# Patient Record
Sex: FEMALE | Race: BLACK OR AFRICAN AMERICAN | Employment: FULL TIME | URBAN - METROPOLITAN AREA
[De-identification: names, ages, dates, MRNs, and addresses within clinical notes are randomized per-mention and may not be internally consistent; named-entity substitution may affect disease eponyms.]

---

## 2018-07-02 ENCOUNTER — HOSPITAL ENCOUNTER (EMERGENCY)
Age: 43
Discharge: HOME | End: 2018-07-02
Attending: EMERGENCY MEDICINE
Payer: COMMERCIAL

## 2018-07-02 VITALS
HEIGHT: 68 IN | SYSTOLIC BLOOD PRESSURE: 130 MMHG | RESPIRATION RATE: 20 BRPM | OXYGEN SATURATION: 100 % | WEIGHT: 230 LBS | BODY MASS INDEX: 34.86 KG/M2 | HEART RATE: 79 BPM | TEMPERATURE: 98.5 F | DIASTOLIC BLOOD PRESSURE: 86 MMHG

## 2018-07-02 DIAGNOSIS — M79.601 RIGHT ARM PAIN: Primary | ICD-10-CM

## 2018-07-02 PROCEDURE — 93005 ELECTROCARDIOGRAM TRACING: CPT

## 2018-07-02 PROCEDURE — 6370000000 HC RX 637 (ALT 250 FOR IP): Performed by: EMERGENCY MEDICINE

## 2018-07-02 PROCEDURE — 99283 EMERGENCY DEPT VISIT LOW MDM: CPT

## 2018-07-02 RX ORDER — ACETAMINOPHEN 325 MG/1
650 TABLET ORAL ONCE
Status: COMPLETED | OUTPATIENT
Start: 2018-07-02 | End: 2018-07-02

## 2018-07-02 RX ADMIN — ACETAMINOPHEN 650 MG: 325 TABLET, FILM COATED ORAL at 20:53

## 2018-07-02 ASSESSMENT — ENCOUNTER SYMPTOMS
COUGH: 0
SHORTNESS OF BREATH: 0
GASTROINTESTINAL NEGATIVE: 1
RESPIRATORY NEGATIVE: 1
ABDOMINAL PAIN: 0
EYES NEGATIVE: 1

## 2018-07-02 ASSESSMENT — PAIN SCALES - GENERAL
PAINLEVEL_OUTOF10: 1
PAINLEVEL_OUTOF10: 1

## 2018-07-03 NOTE — ED NOTES
Pt presents to the ED with complaints of right arm/ shouilder pain that is a 1 out of 10 that she has had for 2 weeks. Pt states she was driving from [place] to out of town when she started having SOB. Pt states that has subsided but is concerned because patient had a blood clot in her right arm after taking birth control 20 years ago. Pt also states she has a pinched nerve in her right arm. Pt denies chest pain,SOB, abdominal pain or any other symptoms at this time. PMS to right arm intact.      Carl Mccarty RN  07/02/18 2033

## 2018-07-03 NOTE — ED NOTES
Bed: 14  Expected date:   Expected time:   Means of arrival:   Comments:  [facility]ren [facility]  07/02/18 2016

## 2018-07-05 LAB
EKG ATRIAL RATE: 96 BPM
EKG P AXIS: 35 DEGREES
EKG P-R INTERVAL: 178 MS
EKG Q-T INTERVAL: 356 MS
EKG QRS DURATION: 86 MS
EKG QTC CALCULATION (BAZETT): 449 MS
EKG R AXIS: 2 DEGREES
EKG T AXIS: 28 DEGREES
EKG VENTRICULAR RATE: 96 BPM